# Patient Record
Sex: FEMALE | Race: WHITE | NOT HISPANIC OR LATINO
[De-identification: names, ages, dates, MRNs, and addresses within clinical notes are randomized per-mention and may not be internally consistent; named-entity substitution may affect disease eponyms.]

---

## 2023-03-16 ENCOUNTER — NON-APPOINTMENT (OUTPATIENT)
Age: 28
End: 2023-03-16

## 2023-03-16 PROBLEM — Z00.00 ENCOUNTER FOR PREVENTIVE HEALTH EXAMINATION: Status: ACTIVE | Noted: 2023-03-16

## 2023-04-12 ENCOUNTER — APPOINTMENT (OUTPATIENT)
Dept: OBGYN | Facility: CLINIC | Age: 28
End: 2023-04-12

## 2023-05-16 ENCOUNTER — APPOINTMENT (OUTPATIENT)
Dept: OBGYN | Facility: CLINIC | Age: 28
End: 2023-05-16
Payer: COMMERCIAL

## 2023-05-16 VITALS
HEIGHT: 64 IN | SYSTOLIC BLOOD PRESSURE: 116 MMHG | DIASTOLIC BLOOD PRESSURE: 74 MMHG | BODY MASS INDEX: 23.56 KG/M2 | WEIGHT: 138 LBS

## 2023-05-16 DIAGNOSIS — Z01.419 ENCOUNTER FOR GYNECOLOGICAL EXAMINATION (GENERAL) (ROUTINE) W/OUT ABNORMAL FINDINGS: ICD-10-CM

## 2023-05-16 PROCEDURE — 99385 PREV VISIT NEW AGE 18-39: CPT

## 2023-05-17 LAB
C TRACH RRNA SPEC QL NAA+PROBE: NOT DETECTED
HPV HIGH+LOW RISK DNA PNL CVX: NOT DETECTED
N GONORRHOEA RRNA SPEC QL NAA+PROBE: NOT DETECTED
SOURCE TP AMPLIFICATION: NORMAL

## 2023-05-21 LAB
CYTOLOGY CVX/VAG DOC THIN PREP: ABNORMAL
FMR1 GENE MUT ANL BLD/T: NORMAL

## 2023-05-22 LAB — CFTR MUT TESTED BLD/T: NEGATIVE

## 2023-05-23 LAB — AR GENE MUT ANL BLD/T: NORMAL

## 2023-05-24 ENCOUNTER — NON-APPOINTMENT (OUTPATIENT)
Age: 28
End: 2023-05-24

## 2023-11-30 DIAGNOSIS — N89.8 OTHER SPECIFIED NONINFLAMMATORY DISORDERS OF VAGINA: ICD-10-CM

## 2023-11-30 RX ORDER — FLUCONAZOLE 150 MG/1
150 TABLET ORAL
Qty: 1 | Refills: 0 | Status: ACTIVE | COMMUNITY
Start: 2023-11-30 | End: 1900-01-01

## 2024-01-29 DIAGNOSIS — Z32.00 ENCOUNTER FOR PREGNANCY TEST, RESULT UNKNOWN: ICD-10-CM

## 2024-01-30 LAB
ABO + RH PNL BLD: NORMAL
BLD GP AB SCN SERPL QL: NORMAL
HCG SERPL-MCNC: 554 MIU/ML
PROGEST SERPL-MCNC: 7.1 NG/ML

## 2024-02-01 LAB — HCG SERPL-MCNC: 1258 MIU/ML

## 2024-02-06 ENCOUNTER — EMERGENCY (EMERGENCY)
Facility: HOSPITAL | Age: 29
LOS: 1 days | Discharge: HOME | End: 2024-02-06
Attending: EMERGENCY MEDICINE | Admitting: EMERGENCY MEDICINE
Payer: COMMERCIAL

## 2024-02-06 VITALS
HEART RATE: 63 BPM | OXYGEN SATURATION: 98 % | DIASTOLIC BLOOD PRESSURE: 77 MMHG | SYSTOLIC BLOOD PRESSURE: 110 MMHG | TEMPERATURE: 98 F | RESPIRATION RATE: 18 BRPM

## 2024-02-06 LAB
ALBUMIN SERPL ELPH-MCNC: 4.6 G/DL — SIGNIFICANT CHANGE UP (ref 3.3–5)
ALP SERPL-CCNC: 68 U/L — SIGNIFICANT CHANGE UP (ref 40–120)
ALT FLD-CCNC: 15 U/L — SIGNIFICANT CHANGE UP (ref 4–33)
ANION GAP SERPL CALC-SCNC: 11 MMOL/L — SIGNIFICANT CHANGE UP (ref 7–14)
APPEARANCE UR: CLEAR — SIGNIFICANT CHANGE UP
AST SERPL-CCNC: 17 U/L — SIGNIFICANT CHANGE UP (ref 4–32)
BACTERIA # UR AUTO: NEGATIVE /HPF — SIGNIFICANT CHANGE UP
BASOPHILS # BLD AUTO: 0.01 K/UL — SIGNIFICANT CHANGE UP (ref 0–0.2)
BASOPHILS NFR BLD AUTO: 0.1 % — SIGNIFICANT CHANGE UP (ref 0–2)
BILIRUB SERPL-MCNC: 0.8 MG/DL — SIGNIFICANT CHANGE UP (ref 0.2–1.2)
BILIRUB UR-MCNC: NEGATIVE — SIGNIFICANT CHANGE UP
BLD GP AB SCN SERPL QL: NEGATIVE — SIGNIFICANT CHANGE UP
BUN SERPL-MCNC: 7 MG/DL — SIGNIFICANT CHANGE UP (ref 7–23)
CALCIUM SERPL-MCNC: 9.4 MG/DL — SIGNIFICANT CHANGE UP (ref 8.4–10.5)
CAST: 0 /LPF — SIGNIFICANT CHANGE UP (ref 0–4)
CHLORIDE SERPL-SCNC: 107 MMOL/L — SIGNIFICANT CHANGE UP (ref 98–107)
CO2 SERPL-SCNC: 23 MMOL/L — SIGNIFICANT CHANGE UP (ref 22–31)
COLOR SPEC: YELLOW — SIGNIFICANT CHANGE UP
CREAT SERPL-MCNC: 0.67 MG/DL — SIGNIFICANT CHANGE UP (ref 0.5–1.3)
DIFF PNL FLD: ABNORMAL
EGFR: 121 ML/MIN/1.73M2 — SIGNIFICANT CHANGE UP
EOSINOPHIL # BLD AUTO: 0.09 K/UL — SIGNIFICANT CHANGE UP (ref 0–0.5)
EOSINOPHIL NFR BLD AUTO: 1.1 % — SIGNIFICANT CHANGE UP (ref 0–6)
GLUCOSE SERPL-MCNC: 93 MG/DL — SIGNIFICANT CHANGE UP (ref 70–99)
GLUCOSE UR QL: NEGATIVE MG/DL — SIGNIFICANT CHANGE UP
HCG SERPL-ACNC: 137.4 MIU/ML — SIGNIFICANT CHANGE UP
HCG SERPL-MCNC: 238 MIU/ML
HCT VFR BLD CALC: 41.3 % — SIGNIFICANT CHANGE UP (ref 34.5–45)
HGB BLD-MCNC: 15.1 G/DL — SIGNIFICANT CHANGE UP (ref 11.5–15.5)
IANC: 5.18 K/UL — SIGNIFICANT CHANGE UP (ref 1.8–7.4)
IMM GRANULOCYTES NFR BLD AUTO: 0.5 % — SIGNIFICANT CHANGE UP (ref 0–0.9)
KETONES UR-MCNC: NEGATIVE MG/DL — SIGNIFICANT CHANGE UP
LEUKOCYTE ESTERASE UR-ACNC: NEGATIVE — SIGNIFICANT CHANGE UP
LYMPHOCYTES # BLD AUTO: 2.13 K/UL — SIGNIFICANT CHANGE UP (ref 1–3.3)
LYMPHOCYTES # BLD AUTO: 26.2 % — SIGNIFICANT CHANGE UP (ref 13–44)
MCHC RBC-ENTMCNC: 33.3 PG — SIGNIFICANT CHANGE UP (ref 27–34)
MCHC RBC-ENTMCNC: 36.6 GM/DL — HIGH (ref 32–36)
MCV RBC AUTO: 91.2 FL — SIGNIFICANT CHANGE UP (ref 80–100)
MONOCYTES # BLD AUTO: 0.68 K/UL — SIGNIFICANT CHANGE UP (ref 0–0.9)
MONOCYTES NFR BLD AUTO: 8.4 % — SIGNIFICANT CHANGE UP (ref 2–14)
NEUTROPHILS # BLD AUTO: 5.18 K/UL — SIGNIFICANT CHANGE UP (ref 1.8–7.4)
NEUTROPHILS NFR BLD AUTO: 63.7 % — SIGNIFICANT CHANGE UP (ref 43–77)
NITRITE UR-MCNC: NEGATIVE — SIGNIFICANT CHANGE UP
NRBC # BLD: 0 /100 WBCS — SIGNIFICANT CHANGE UP (ref 0–0)
NRBC # FLD: 0 K/UL — SIGNIFICANT CHANGE UP (ref 0–0)
PH UR: 6 — SIGNIFICANT CHANGE UP (ref 5–8)
PLATELET # BLD AUTO: 208 K/UL — SIGNIFICANT CHANGE UP (ref 150–400)
POTASSIUM SERPL-MCNC: 4.2 MMOL/L — SIGNIFICANT CHANGE UP (ref 3.5–5.3)
POTASSIUM SERPL-SCNC: 4.2 MMOL/L — SIGNIFICANT CHANGE UP (ref 3.5–5.3)
PROT SERPL-MCNC: 7.7 G/DL — SIGNIFICANT CHANGE UP (ref 6–8.3)
PROT UR-MCNC: NEGATIVE MG/DL — SIGNIFICANT CHANGE UP
RBC # BLD: 4.53 M/UL — SIGNIFICANT CHANGE UP (ref 3.8–5.2)
RBC # FLD: 11.1 % — SIGNIFICANT CHANGE UP (ref 10.3–14.5)
RBC CASTS # UR COMP ASSIST: 2 /HPF — SIGNIFICANT CHANGE UP (ref 0–4)
REVIEW: SIGNIFICANT CHANGE UP
RH IG SCN BLD-IMP: POSITIVE — SIGNIFICANT CHANGE UP
SODIUM SERPL-SCNC: 141 MMOL/L — SIGNIFICANT CHANGE UP (ref 135–145)
SP GR SPEC: 1 — SIGNIFICANT CHANGE UP (ref 1–1.03)
SQUAMOUS # UR AUTO: 1 /HPF — SIGNIFICANT CHANGE UP (ref 0–5)
UROBILINOGEN FLD QL: 0.2 MG/DL — SIGNIFICANT CHANGE UP (ref 0.2–1)
WBC # BLD: 8.13 K/UL — SIGNIFICANT CHANGE UP (ref 3.8–10.5)
WBC # FLD AUTO: 8.13 K/UL — SIGNIFICANT CHANGE UP (ref 3.8–10.5)
WBC UR QL: 0 /HPF — SIGNIFICANT CHANGE UP (ref 0–5)

## 2024-02-06 PROCEDURE — 99285 EMERGENCY DEPT VISIT HI MDM: CPT

## 2024-02-06 PROCEDURE — 76817 TRANSVAGINAL US OBSTETRIC: CPT | Mod: 26

## 2024-02-06 NOTE — ED PROVIDER NOTE - OBJECTIVE STATEMENT
28-year-old female G1, P0 presents to the ED for vaginal bleeding since yesterday.  Patient states she noted minor spotting yesterday but today when she woke up she noticed her entire toilet filled with blood and some clots.  She is endorsing abdominal cramps.   She is followed by gynecologist Dr. Orlando who has been trending her beta hCG for the last week. She denies any nausea vomiting, fevers or chills.  She denies shortness of breath, chest pain, lightheadedness, dizziness.

## 2024-02-06 NOTE — ED ADULT NURSE NOTE - TEMPLATE
----- Message from Micah Gonzalez MD sent at 3/24/2017 11:26 AM CDT -----  Please send letter about candidal esophagitis, to take medications as prescribed by infectious disease.   OB/GYN

## 2024-02-06 NOTE — ED PROVIDER NOTE - PHYSICAL EXAMINATION
Physical Exam:  Gen:  Well appearing, awake, alert, non-toxic appearing  Head: NCAT  HEENT: Normal conjunctiva, trachea midline, moist mucous membranes  Lung: CTAB, no respiratory distress, no wheezes/rhonchi/rales B/L, speaking in full sentences  CV: RRR, no murmurs, rubs or gallops  Abd: Soft, non-tender, non-distended,   MSK: No visible deformities,   Neuro: No focal motor deficits  Skin: Warm, well perfused, no visible rashes, no leg swelling  Psych: appropriate affect and mood

## 2024-02-06 NOTE — ED ADULT NURSE NOTE - NSFALLUNIVINTERV_ED_ALL_ED
Bed/Stretcher in lowest position, wheels locked, appropriate side rails in place/Call bell, personal items and telephone in reach/Instruct patient to call for assistance before getting out of bed/chair/stretcher/Non-slip footwear applied when patient is off stretcher/[place] to call system/Physically safe environment - no spills, clutter or unnecessary equipment/Purposeful proactive rounding/Room/bathroom lighting operational, light cord in reach

## 2024-02-06 NOTE — ED PROVIDER NOTE - NSFOLLOWUPINSTRUCTIONS_ED_ALL_ED_FT
Advance activity as tolerated.  Continue all previously prescribed medications as directed unless otherwise instructed.  Follow up with your OB in 48-72 hours- bring copies of your results.  Return to the ER for worsening or persistent symptoms, and/or ANY NEW OR CONCERNING SYMPTOMS. If you have issues obtaining follow up, please call: [phone] to obtain a doctor or specialist who takes your insurance in your area.  You may call [phone] to make an appointment with the internal medicine clinic.

## 2024-02-06 NOTE — ED ADULT NURSE NOTE - OBJECTIVE STATEMENT
pt received to intake 5, aox4.  pt c/o vag bleed with clots since yesterday.  pt is 7 weeks pregnant.  denies pain, appears in no acute distress.  SL placed, labs sent

## 2024-02-06 NOTE — ED ADULT TRIAGE NOTE - CHIEF COMPLAINT QUOTE
approx. 7 weeks pregnant, c/o vaginal bleeding with clots and cramping for couple days, RR even and unlabored, denies medical hx

## 2024-02-06 NOTE — ED PROVIDER NOTE - PATIENT PORTAL LINK FT
You can access the FollowMyHealth Patient Portal offered by [facility] by registering at the following website: [email or web address]. By joining [employer]’s FollowMyHealth portal, you will also be able to view your health information using other applications (apps) compatible with our system.

## 2024-02-07 LAB
CULTURE RESULTS: ABNORMAL
SPECIMEN SOURCE: SIGNIFICANT CHANGE UP

## 2024-02-09 DIAGNOSIS — R39.89 OTHER SYMPTOMS AND SIGNS INVOLVING THE GENITOURINARY SYSTEM: ICD-10-CM

## 2024-02-14 LAB
APPEARANCE: CLEAR
BACTERIA: ABNORMAL /HPF
BILIRUBIN URINE: NEGATIVE
BLOOD URINE: NEGATIVE
CAST: 1 /LPF
COLOR: YELLOW
EPITHELIAL CELLS: 4 /HPF
GLUCOSE QUALITATIVE U: NEGATIVE MG/DL
HCG SERPL-MCNC: 11 MIU/ML
KETONES URINE: NEGATIVE MG/DL
LEUKOCYTE ESTERASE URINE: NEGATIVE
MICROSCOPIC-UA: NORMAL
NITRITE URINE: NEGATIVE
PH URINE: 5.5
PROTEIN URINE: NEGATIVE MG/DL
RED BLOOD CELLS URINE: 2 /HPF
SPECIFIC GRAVITY URINE: 1.01
UROBILINOGEN URINE: 0.2 MG/DL
WHITE BLOOD CELLS URINE: 2 /HPF

## 2024-02-15 LAB — BACTERIA UR CULT: NORMAL

## 2024-04-01 PROBLEM — Z78.9 OTHER SPECIFIED HEALTH STATUS: Chronic | Status: ACTIVE | Noted: 2024-02-06

## 2024-05-06 ENCOUNTER — LABORATORY RESULT (OUTPATIENT)
Age: 29
End: 2024-05-06

## 2024-05-06 ENCOUNTER — APPOINTMENT (OUTPATIENT)
Dept: OBGYN | Facility: CLINIC | Age: 29
End: 2024-05-06
Payer: COMMERCIAL

## 2024-05-06 VITALS
DIASTOLIC BLOOD PRESSURE: 64 MMHG | BODY MASS INDEX: 22.53 KG/M2 | WEIGHT: 132 LBS | SYSTOLIC BLOOD PRESSURE: 110 MMHG | HEIGHT: 64 IN

## 2024-05-06 DIAGNOSIS — N91.1 SECONDARY AMENORRHEA: ICD-10-CM

## 2024-05-06 PROCEDURE — 81025 URINE PREGNANCY TEST: CPT

## 2024-05-06 PROCEDURE — 99214 OFFICE O/P EST MOD 30 MIN: CPT | Mod: 25

## 2024-05-06 PROCEDURE — 36415 COLL VENOUS BLD VENIPUNCTURE: CPT

## 2024-05-06 PROCEDURE — 76830 TRANSVAGINAL US NON-OB: CPT

## 2024-05-07 DIAGNOSIS — Z32.01 ENCOUNTER FOR PREGNANCY TEST, RESULT POSITIVE: ICD-10-CM

## 2024-05-07 LAB
ABO + RH PNL BLD: NORMAL
HCG SERPL-MCNC: ABNORMAL MIU/ML
PROGEST SERPL-MCNC: 11.5 NG/ML

## 2024-05-09 LAB — HCG SERPL-MCNC: ABNORMAL MIU/ML

## 2024-05-10 DIAGNOSIS — Z71.9 COUNSELING, UNSPECIFIED: ICD-10-CM

## 2024-05-13 ENCOUNTER — TRANSCRIPTION ENCOUNTER (OUTPATIENT)
Age: 29
End: 2024-05-13

## 2024-05-13 ENCOUNTER — APPOINTMENT (OUTPATIENT)
Dept: OBGYN | Facility: CLINIC | Age: 29
End: 2024-05-13
Payer: COMMERCIAL

## 2024-05-13 LAB
BASOPHILS # BLD AUTO: 0.01 K/UL
BASOPHILS NFR BLD AUTO: 0.2 %
EOSINOPHIL # BLD AUTO: 0.29 K/UL
EOSINOPHIL NFR BLD AUTO: 4.7 %
HCT VFR BLD CALC: 35.8 %
HGB BLD-MCNC: 12.6 G/DL
IMM GRANULOCYTES NFR BLD AUTO: 0.2 %
LYMPHOCYTES # BLD AUTO: 1.81 K/UL
LYMPHOCYTES NFR BLD AUTO: 29.3 %
MAN DIFF?: NORMAL
MCHC RBC-ENTMCNC: 32.6 PG
MCHC RBC-ENTMCNC: 35.2 GM/DL
MCV RBC AUTO: 92.7 FL
MONOCYTES # BLD AUTO: 0.61 K/UL
MONOCYTES NFR BLD AUTO: 9.9 %
NEUTROPHILS # BLD AUTO: 3.45 K/UL
NEUTROPHILS NFR BLD AUTO: 55.7 %
PLATELET # BLD AUTO: 181 K/UL
RBC # BLD: 3.86 M/UL
RBC # FLD: 11.6 %
WBC # FLD AUTO: 6.18 K/UL

## 2024-05-13 PROCEDURE — 99214 OFFICE O/P EST MOD 30 MIN: CPT | Mod: 95

## 2024-05-13 RX ORDER — IBUPROFEN 600 MG/1
600 TABLET, FILM COATED ORAL 4 TIMES DAILY
Qty: 15 | Refills: 0 | Status: ACTIVE | COMMUNITY
Start: 2024-05-13 | End: 1900-01-01

## 2024-05-13 NOTE — HISTORY OF PRESENT ILLNESS
[FreeTextEntry1] : This visit was provided via telehealth using 2-way audio and visual technology. The patient, MATTY DHILLON and provider, Patrice Sanches, were both located in [facility] and both participated in the telehealth encounter.  Verbal consent 2024 by, patient, MATTY DHILLON. Pt location: Home, [place], [place] Provider location: office, [address] [place] [address]  29yo  @ by LMP 3/3/24 here to discuss options for early pregnancy loss.  I have independently reviewed and interpreted ultrasound performed on 24, CRL 6.4 WGA.  No fetal heart tones identified.  Positive yolk sac.  No adnexal masses were seen.  No free fluid. Consistent with early pregnancy loss.  All: NKDA Meds: denies PMH: denies Surg: denies Gyn: remote h/o abnormal pap Ob: early SAB x1 (2024) Soc: denies x3, denies vape use  Reviewed common side effects of medication  as well as DA. Medical   Patient denies medical history of: Liver disease, Renal failure, Chronic adrenal failure, Long term systemic corticosteroid use, IUD in place, Anticoagulant use of hemorrhagic disorder, Inherited porphyrias, Anemia, Allergy to mifepristone, misoprostol or other prostaglandins  They understand that 10% of people who take medication  will need more medication or a surgical procedure to empty the uterus. They understand that medication  is not reversible. Common side effects and additional risks below reviewed.  Common side effects: cramping, bleeding, low grade fever, diarrhea, nausea, vomiting, headache, dizziness, back pain, feeling tired  The risks of medication  including: -	Continuing pregnancy, pregnancy tissue or blood clots in the uterus and the need for further medication or surgery -	Incomplete  causing heavy bleeding, infection, or both (which may require other testing or treatments such as further medication or surgery) -	Bleeding too much or too long which may require further treatment with medication or surgery, or a blood transfusion -	Infection in the uterus, which may require further treatment with medication, surgery or antibiotics.  It may also require hospital admission -	Allergic reaction to any or all of the medications used    Dilation and Aspiration Counseling   Options for the pregnancy were discussed with the patient, including continuation of pregnancy, and dilation and curettage (D&C) in the office under local anesthesia or in the operating room under sedation.   Risks of D&A includin. Infection: Patient was counseled on risk of infection and the use of prophylactic antibiotics, signs/symptoms of pre- and post-operative infection were reviewed. 2. Hemorrhage: Patient was counseled on the risk of hemorrhage, possibly requiring blood (and/or blood products) transfusion, management including use of but not limited to uterotonic medications. PT HAS NO OBJECTIONS TO BLOOD TRANSFUSION OR RECEIVING BLOOD PRODUCTS. 3. Injury/Perforation:  Risk of injury to vagina, cervix, uterus reviewed. Patient was counseled on the risk of uterine perforation with/without need for laparoscopy/laparotomy with/without injury to adjacent organs such as bowel/bladder. Reviewed risk of hysterectomy. 4.            Risk of retained products of conception  with/without need for medication or suction procedure to empty the uterus.     Pt is opting for DA under anesthesia. They understand these risks and agree to above.  I strongly recommended genetic testing given this is second miscarriage in a row.  The patient also understands it is their responsibility to bring to the attention of their physician any unusual symptoms following the procedure and to report to follow-up examinations.   They are sure of their decision and deny any coercion from family, friends or healthcare providers. The patient had the opportunity to ask questions and all questions were answered.

## 2024-05-13 NOTE — PLAN
[FreeTextEntry1] : 27 yo  with missed  9 weeks by dates, measuring 6+ weeks.    1. Dilation and Aspiration "Suction D&C" - All available medical records reviewed - All consents signed today, all questions/concerns addressed - Patient offered pamphlet for support services - Genetics- requesting, will send chromosomes and hold cells   2. Surgery scheduling - Patient to be precertified for D+A - D+A scheduled for 24 [facility] - PSTs- not indicated   3. ID/Neuro/cervical prep - doxycycline 200 mg in OR - Reviewed Ibuprofen 600 mg po q 6 prn   4. Labs/Blood type  - CBC at [facility] prior to procedure- done  WNL; Hgb 12.6 - TS on arrival -RH pos   5. Contraception/Conception -Patient desires pregnancy, will continue pnv   6. Post-op - Post-operative follow-up phone call virtual visit to be scheduled in 2 weeks - pre- and Post-operative instruction sheet given, reviewed bleeding and infection precautions - Provided 24 hour contact phone number - All questions/concerns of patient addressed to their satisfaction

## 2024-05-14 ENCOUNTER — APPOINTMENT (OUTPATIENT)
Dept: OBGYN | Facility: HOSPITAL | Age: 29
End: 2024-05-14

## 2024-05-14 ENCOUNTER — TRANSCRIPTION ENCOUNTER (OUTPATIENT)
Age: 29
End: 2024-05-14

## 2024-05-14 ENCOUNTER — RESULT REVIEW (OUTPATIENT)
Age: 29
End: 2024-05-14

## 2024-05-14 ENCOUNTER — OUTPATIENT (OUTPATIENT)
Dept: INPATIENT UNIT | Facility: HOSPITAL | Age: 29
LOS: 1 days | End: 2024-05-14
Payer: COMMERCIAL

## 2024-05-14 ENCOUNTER — APPOINTMENT (OUTPATIENT)
Dept: OBGYN | Facility: CLINIC | Age: 29
End: 2024-05-14

## 2024-05-14 VITALS
HEART RATE: 65 BPM | DIASTOLIC BLOOD PRESSURE: 69 MMHG | OXYGEN SATURATION: 100 % | RESPIRATION RATE: 15 BRPM | SYSTOLIC BLOOD PRESSURE: 99 MMHG

## 2024-05-14 VITALS
HEART RATE: 78 BPM | WEIGHT: 132.94 LBS | HEIGHT: 66 IN | RESPIRATION RATE: 16 BRPM | TEMPERATURE: 98 F | SYSTOLIC BLOOD PRESSURE: 103 MMHG | OXYGEN SATURATION: 100 % | DIASTOLIC BLOOD PRESSURE: 58 MMHG

## 2024-05-14 DIAGNOSIS — Z34.91 ENCOUNTER FOR SUPERVISION OF NORMAL PREGNANCY, UNSPECIFIED, FIRST TRIMESTER: ICD-10-CM

## 2024-05-14 DIAGNOSIS — O02.1 MISSED ABORTION: ICD-10-CM

## 2024-05-14 LAB — BLD GP AB SCN SERPL QL: SIGNIFICANT CHANGE UP

## 2024-05-14 PROCEDURE — 76998 US GUIDE INTRAOP: CPT | Mod: 26,GC

## 2024-05-14 PROCEDURE — 36415 COLL VENOUS BLD VENIPUNCTURE: CPT

## 2024-05-14 PROCEDURE — 86850 RBC ANTIBODY SCREEN: CPT

## 2024-05-14 PROCEDURE — 88305 TISSUE EXAM BY PATHOLOGIST: CPT

## 2024-05-14 PROCEDURE — 59820 CARE OF MISCARRIAGE: CPT

## 2024-05-14 PROCEDURE — 88305 TISSUE EXAM BY PATHOLOGIST: CPT | Mod: 26

## 2024-05-14 PROCEDURE — 88264 CHROMOSOME ANALYSIS 20-25: CPT

## 2024-05-14 PROCEDURE — 86900 BLOOD TYPING SEROLOGIC ABO: CPT

## 2024-05-14 PROCEDURE — 86901 BLOOD TYPING SEROLOGIC RH(D): CPT

## 2024-05-14 PROCEDURE — 88280 CHROMOSOME KARYOTYPE STUDY: CPT

## 2024-05-14 PROCEDURE — 88233 TISSUE CULTURE SKIN/BIOPSY: CPT

## 2024-05-14 RX ORDER — ACETAMINOPHEN 500 MG
975 TABLET ORAL ONCE
Refills: 0 | Status: COMPLETED | OUTPATIENT
Start: 2024-05-14 | End: 2024-05-14

## 2024-05-14 RX ORDER — KETOROLAC TROMETHAMINE 30 MG/ML
30 SYRINGE (ML) INJECTION ONCE
Refills: 0 | Status: DISCONTINUED | OUTPATIENT
Start: 2024-05-14 | End: 2024-05-14

## 2024-05-14 RX ORDER — ACETAMINOPHEN 500 MG
1000 TABLET ORAL ONCE
Refills: 0 | Status: DISCONTINUED | OUTPATIENT
Start: 2024-05-14 | End: 2024-05-14

## 2024-05-14 RX ORDER — FENTANYL CITRATE 50 UG/ML
25 INJECTION INTRAVENOUS ONCE
Refills: 0 | Status: DISCONTINUED | OUTPATIENT
Start: 2024-05-14 | End: 2024-05-14

## 2024-05-14 RX ORDER — HYDROMORPHONE HYDROCHLORIDE 2 MG/ML
0.5 INJECTION INTRAMUSCULAR; INTRAVENOUS; SUBCUTANEOUS ONCE
Refills: 0 | Status: DISCONTINUED | OUTPATIENT
Start: 2024-05-14 | End: 2024-05-14

## 2024-05-14 RX ORDER — ONDANSETRON 8 MG/1
4 TABLET, FILM COATED ORAL ONCE
Refills: 0 | Status: COMPLETED | OUTPATIENT
Start: 2024-05-14 | End: 2024-05-14

## 2024-05-14 RX ADMIN — Medication 250 MILLIGRAM(S): at 10:55

## 2024-05-14 RX ADMIN — ONDANSETRON 4 MILLIGRAM(S): 8 TABLET, FILM COATED ORAL at 11:25

## 2024-05-14 RX ADMIN — Medication 975 MILLIGRAM(S): at 08:57

## 2024-05-14 NOTE — ASU DISCHARGE PLAN (ADULT/PEDIATRIC) - CARE PROVIDER_API CALL
Verónica Ibrahim  Obstetrics and Gynecology  [address] [place] [address], [address]  [place] [address]  Phone: [phone]  Fax: [phone]  Follow Up Time: 2 weeks

## 2024-05-14 NOTE — BRIEF OPERATIVE NOTE - OPERATION/FINDINGS
8 week sized uterus. Gestational sac visualized on ultrasound prior to procedure. Thin endometrial lining noted at conclusion of procedure.

## 2024-05-14 NOTE — ASU DISCHARGE PLAN (ADULT/PEDIATRIC) - MEDICATION INSTRUCTIONS
May take ibuprofen 600mg every 6 hours as needed for pain as prescribed. May also take tylenol 975mg every 6 hours as needed.

## 2024-05-14 NOTE — BRIEF OPERATIVE NOTE - NSICDXBRIEFPROCEDURE_GEN_ALL_CORE_FT
PROCEDURES:  Dilation and curettage of uterus using suction with ultrasound guidance 14-May-2024 11:32:36  Anjana Salinas

## 2024-05-14 NOTE — ASU DISCHARGE PLAN (ADULT/PEDIATRIC) - PAIN MANAGEMENT
[facility]  Administration of Anesthesia  I, Eliana Jean agree to be cared for by a physician anesthesiologist alone and/or with a nurse anesthetist, who is specially trained to monitor me and give me medicine to put me to sleep or keep me comfortable during my procedure    I understand that my anesthesiologist and/or anesthetist is not an employee or agent of [facility] or [employer]. He or she works for [facility]    As the patient asking for anesthesia services, I agree to:  Allow the anesthesiologist (anesthesia doctor) to give me medicine and do additional procedures as necessary. Some examples are: Starting or using an “IV” to give me medicine, fluids or blood during my procedure, and having a breathing tube placed to help me breathe when I’m asleep (intubation). In the event that my heart stops working properly, I understand that my anesthesiologist will make every effort to sustain my life, unless otherwise directed by [facility] Do Not Resuscitate documents.  Tell my anesthesia doctor before my procedure:  If I am pregnant.  The last time that I ate or drank.  iii. All of the medicines I take (including prescriptions, herbal supplements, and pills I can buy without a prescription (including street drugs/illegal medications). Failure to inform my anesthesiologist about these medicines may increase my risk of anesthetic complications.  iv.If I am allergic to anything or have had a reaction to anesthesia before.  I understand how the anesthesia medicine will help me (benefits).  I understand that with any type of anesthesia medicine there are risks:  The most common risks are: nausea, vomiting, sore throat, muscle soreness, damage to my eyes, mouth, or teeth (from breathing tube placement).  Rare risks include: remembering what happened during my procedure, allergic reactions to medications, injury to my airway, heart, lungs, vision, nerves, or  muscles and in extremely rare instances death.  My doctor has explained to me other choices available to me for my care (alternatives).  Pregnant Patients (“epidural”):  I understand that the risks of having an epidural (medicine given into my back to help control pain during labor), include itching, low blood pressure, difficulty urinating, headache or slowing of the baby’s heart. Very rare risks include infection, bleeding, seizure, irregular heart rhythms and nerve injury.  Regional Anesthesia (“spinal”, “epidural”, & “nerve blocks”):  I understand that rare but potential complications include headache, bleeding, infection, seizure, irregular heart rhythms, and nerve injury.    _____________________________________________________________________________  Patient (or Representative) Signature/Relationship to Patient  Date   Time    _____________________________________________________________________________   Name (if used)    Language/Organization   Time    _____________________________________________________________________________  Nurse Anesthetist Signature     Date   Time  _____________________________________________________________________________  Anesthesiologist Signature     Date   Time  I have discussed the procedure and information above with the patient (or patient’s representative) and answered their questions. The patient or their representative has agreed to have anesthesia services.    _____________________________________________________________________________  Witness        Date   Time  I have verified that the signature is that of the patient or patient’s representative, and that it was signed before the procedure  Patient Name: Eliana Jean     : 1977                 Printed: 2025 at 1:50 PM    Medical Record #: [id number]                                            Page 1 of 1  ----------ANESTHESIA CONSENT----------     Take over the counter pain medication/Prescriptions electronically submitted to pharmacy from doctor's office

## 2024-05-15 LAB — SURGICAL PATHOLOGY STUDY: SIGNIFICANT CHANGE UP

## 2024-05-17 ENCOUNTER — APPOINTMENT (OUTPATIENT)
Dept: OBGYN | Facility: CLINIC | Age: 29
End: 2024-05-17

## 2024-05-31 ENCOUNTER — APPOINTMENT (OUTPATIENT)
Dept: OBGYN | Facility: CLINIC | Age: 29
End: 2024-05-31
Payer: COMMERCIAL

## 2024-05-31 DIAGNOSIS — O02.1 MISSED ABORTION: ICD-10-CM

## 2024-05-31 PROCEDURE — 99024 POSTOP FOLLOW-UP VISIT: CPT

## 2024-05-31 NOTE — HISTORY OF PRESENT ILLNESS
[FreeTextEntry1] : This visit was provided via telehealth using 2-way audio and visual technology. The patient, MATTY DHILLON and provider, Patrice Sanches, were both located in [facility] and both participated in the telehealth encounter. Verbal consent 2024  by, patient, MATTY DHILLON.   Pt location: Home, [place], [place] Provider location: office, [address] [place] [address]  28 yo  s/p OR aspiration for early pregnancy loss (second consecutive) presenting for follow up. PT feeling well, no bleeding, no pain. Discussed genetic testing is pending and difference between karyotype and microarray in lay person's terms.   recently went for semenanalysis.
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
## 2024-06-06 LAB — CHROM ANALY OVERALL INTERP SPEC-IMP: SIGNIFICANT CHANGE UP

## 2024-06-14 DIAGNOSIS — O03.9 COMPLETE OR UNSPECIFIED SPONTANEOUS ABORTION W/OUT COMPLICATION: ICD-10-CM

## 2024-06-24 ENCOUNTER — ASOB RESULT (OUTPATIENT)
Age: 29
End: 2024-06-24

## 2024-06-24 ENCOUNTER — APPOINTMENT (OUTPATIENT)
Dept: MATERNAL FETAL MEDICINE | Facility: CLINIC | Age: 29
End: 2024-06-24
Payer: COMMERCIAL

## 2024-06-24 PROCEDURE — 99202 OFFICE O/P NEW SF 15 MIN: CPT | Mod: 95

## 2024-08-12 ENCOUNTER — LABORATORY RESULT (OUTPATIENT)
Age: 29
End: 2024-08-12

## 2024-08-26 DIAGNOSIS — Z87.59 PERSONAL HISTORY OF OTHER COMPLICATIONS OF PREGNANCY, CHILDBIRTH AND THE PUERPERIUM: ICD-10-CM

## 2024-08-27 ENCOUNTER — NON-APPOINTMENT (OUTPATIENT)
Age: 29
End: 2024-08-27

## 2024-09-11 ENCOUNTER — APPOINTMENT (OUTPATIENT)
Dept: ANTEPARTUM | Facility: CLINIC | Age: 29
End: 2024-09-11

## 2024-09-11 ENCOUNTER — APPOINTMENT (OUTPATIENT)
Dept: MATERNAL FETAL MEDICINE | Facility: CLINIC | Age: 29
End: 2024-09-11
Payer: COMMERCIAL

## 2024-09-11 VITALS
HEART RATE: 56 BPM | OXYGEN SATURATION: 99 % | WEIGHT: 136 LBS | BODY MASS INDEX: 21.86 KG/M2 | RESPIRATION RATE: 16 BRPM | DIASTOLIC BLOOD PRESSURE: 60 MMHG | HEIGHT: 66 IN | SYSTOLIC BLOOD PRESSURE: 100 MMHG

## 2024-09-11 DIAGNOSIS — Z32.01 ENCOUNTER FOR PREGNANCY TEST, RESULT POSITIVE: ICD-10-CM

## 2024-09-11 DIAGNOSIS — N91.1 SECONDARY AMENORRHEA: ICD-10-CM

## 2024-09-11 DIAGNOSIS — Z01.419 ENCOUNTER FOR GYNECOLOGICAL EXAMINATION (GENERAL) (ROUTINE) W/OUT ABNORMAL FINDINGS: ICD-10-CM

## 2024-09-11 DIAGNOSIS — Z87.42 PERSONAL HISTORY OF OTHER DISEASES OF THE FEMALE GENITAL TRACT: ICD-10-CM

## 2024-09-11 DIAGNOSIS — Z87.59 PERSONAL HISTORY OF OTHER COMPLICATIONS OF PREGNANCY, CHILDBIRTH AND THE PUERPERIUM: ICD-10-CM

## 2024-09-11 DIAGNOSIS — N96 RECURRENT PREGNANCY LOSS: ICD-10-CM

## 2024-09-11 DIAGNOSIS — Z71.9 COUNSELING, UNSPECIFIED: ICD-10-CM

## 2024-09-11 DIAGNOSIS — R39.89 OTHER SYMPTOMS AND SIGNS INVOLVING THE GENITOURINARY SYSTEM: ICD-10-CM

## 2024-09-11 DIAGNOSIS — Z32.00 ENCOUNTER FOR PREGNANCY TEST, RESULT UNKNOWN: ICD-10-CM

## 2024-09-11 DIAGNOSIS — Z31.69 ENCOUNTER FOR OTHER GENERAL COUNSELING AND ADVICE ON PROCREATION: ICD-10-CM

## 2024-09-11 PROCEDURE — 99214 OFFICE O/P EST MOD 30 MIN: CPT

## 2024-09-11 NOTE — HISTORY OF PRESENT ILLNESS
[FreeTextEntry1] : Valerie presents to discuss her pregnancy hisotry and the recent evaluations she has received.
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
## 2024-09-11 NOTE — DISCUSSION/SUMMARY
[FreeTextEntry1] : Reassurances given, with no interventions needed at this time.   Upon a positive urine HCG, an early sonogram (6 weeks) and serum HCG and Progesterone is recommended.

## 2024-09-20 ENCOUNTER — APPOINTMENT (OUTPATIENT)
Dept: OBGYN | Facility: CLINIC | Age: 29
End: 2024-09-20

## 2024-09-30 DIAGNOSIS — N96 RECURRENT PREGNANCY LOSS: ICD-10-CM

## 2024-10-01 LAB
HCG SERPL-MCNC: 947 MIU/ML
PROGEST SERPL-MCNC: 18.5 NG/ML

## 2024-10-02 DIAGNOSIS — Z32.00 ENCOUNTER FOR PREGNANCY TEST, RESULT UNKNOWN: ICD-10-CM

## 2024-10-07 LAB
HCG SERPL-MCNC: 1891 MIU/ML
HCG SERPL-MCNC: 3843 MIU/ML
PROGEST SERPL-MCNC: 18.3 NG/ML
PROGEST SERPL-MCNC: 18.7 NG/ML

## 2024-10-10 ENCOUNTER — APPOINTMENT (OUTPATIENT)
Dept: OBGYN | Facility: CLINIC | Age: 29
End: 2024-10-10
Payer: COMMERCIAL

## 2024-10-10 PROCEDURE — 36415 COLL VENOUS BLD VENIPUNCTURE: CPT

## 2024-10-10 PROCEDURE — 76817 TRANSVAGINAL US OBSTETRIC: CPT

## 2024-10-10 PROCEDURE — 81002 URINALYSIS NONAUTO W/O SCOPE: CPT

## 2024-10-10 PROCEDURE — 81025 URINE PREGNANCY TEST: CPT

## 2024-10-10 PROCEDURE — 99214 OFFICE O/P EST MOD 30 MIN: CPT
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
## 2024-10-29 ENCOUNTER — APPOINTMENT (OUTPATIENT)
Dept: OBGYN | Facility: CLINIC | Age: 29
End: 2024-10-29
Payer: COMMERCIAL

## 2024-10-29 ENCOUNTER — APPOINTMENT (OUTPATIENT)
Dept: OBGYN | Facility: CLINIC | Age: 29
End: 2024-10-29

## 2024-10-29 PROCEDURE — 0502F SUBSEQUENT PRENATAL CARE: CPT

## 2024-10-29 PROCEDURE — 81002 URINALYSIS NONAUTO W/O SCOPE: CPT

## 2024-10-29 PROCEDURE — 76817 TRANSVAGINAL US OBSTETRIC: CPT

## 2024-11-18 ENCOUNTER — APPOINTMENT (OUTPATIENT)
Dept: OBGYN | Facility: CLINIC | Age: 29
End: 2024-11-18
Payer: COMMERCIAL

## 2024-11-18 PROCEDURE — 0502F SUBSEQUENT PRENATAL CARE: CPT

## 2024-11-18 PROCEDURE — 76801 OB US < 14 WKS SINGLE FETUS: CPT

## 2024-11-18 PROCEDURE — 81002 URINALYSIS NONAUTO W/O SCOPE: CPT

## 2024-11-27 ENCOUNTER — ASOB RESULT (OUTPATIENT)
Age: 29
End: 2024-11-27

## 2024-11-27 ENCOUNTER — APPOINTMENT (OUTPATIENT)
Dept: ANTEPARTUM | Facility: CLINIC | Age: 29
End: 2024-11-27
Payer: COMMERCIAL

## 2024-11-27 PROCEDURE — 76813 OB US NUCHAL MEAS 1 GEST: CPT

## 2024-11-27 PROCEDURE — 76801 OB US < 14 WKS SINGLE FETUS: CPT

## 2024-12-23 ENCOUNTER — APPOINTMENT (OUTPATIENT)
Dept: OBGYN | Facility: CLINIC | Age: 29
End: 2024-12-23
Payer: COMMERCIAL

## 2024-12-23 PROCEDURE — 81002 URINALYSIS NONAUTO W/O SCOPE: CPT

## 2024-12-23 PROCEDURE — 36415 COLL VENOUS BLD VENIPUNCTURE: CPT

## 2024-12-23 PROCEDURE — 76815 OB US LIMITED FETUS(S): CPT

## 2024-12-23 PROCEDURE — 0502F SUBSEQUENT PRENATAL CARE: CPT

## 2025-01-22 ENCOUNTER — APPOINTMENT (OUTPATIENT)
Dept: ANTEPARTUM | Facility: CLINIC | Age: 30
End: 2025-01-22
Payer: COMMERCIAL

## 2025-01-22 ENCOUNTER — ASOB RESULT (OUTPATIENT)
Age: 30
End: 2025-01-22

## 2025-01-22 PROCEDURE — 0502F SUBSEQUENT PRENATAL CARE: CPT

## 2025-01-22 PROCEDURE — 81002 URINALYSIS NONAUTO W/O SCOPE: CPT

## 2025-01-30 ENCOUNTER — NON-APPOINTMENT (OUTPATIENT)
Age: 30
End: 2025-01-30

## 2025-02-03 ENCOUNTER — EMERGENCY (EMERGENCY)
Facility: HOSPITAL | Age: 30
LOS: 1 days | Discharge: HOME | End: 2025-02-03
Admitting: STUDENT IN AN ORGANIZED HEALTH CARE EDUCATION/TRAINING PROGRAM
Payer: COMMERCIAL

## 2025-02-03 VITALS
HEART RATE: 92 BPM | WEIGHT: 141.1 LBS | TEMPERATURE: 97 F | RESPIRATION RATE: 20 BRPM | OXYGEN SATURATION: 100 % | DIASTOLIC BLOOD PRESSURE: 77 MMHG | SYSTOLIC BLOOD PRESSURE: 126 MMHG

## 2025-02-03 LAB
ADD ON TEST-SPECIMEN IN LAB: SIGNIFICANT CHANGE UP
FLUAV AG NPH QL: DETECTED
FLUBV AG NPH QL: SIGNIFICANT CHANGE UP
RSV RNA NPH QL NAA+NON-PROBE: SIGNIFICANT CHANGE UP
SARS-COV-2 RNA SPEC QL NAA+PROBE: SIGNIFICANT CHANGE UP

## 2025-02-03 PROCEDURE — 93010 ELECTROCARDIOGRAM REPORT: CPT

## 2025-02-03 PROCEDURE — 99284 EMERGENCY DEPT VISIT MOD MDM: CPT

## 2025-02-03 RX ORDER — ACETAMINOPHEN 160 MG/5ML
650 SUSPENSION ORAL ONCE
Refills: 0 | Status: COMPLETED | OUTPATIENT
Start: 2025-02-03 | End: 2025-02-03

## 2025-02-03 RX ORDER — PHENOL 1.4 %
1 AEROSOL, SPRAY (ML) MUCOUS MEMBRANE ONCE
Refills: 0 | Status: COMPLETED | OUTPATIENT
Start: 2025-02-03 | End: 2025-02-03

## 2025-02-03 RX ADMIN — Medication 1 LOZENGE: at 09:50

## 2025-02-03 NOTE — ED PROVIDER NOTE - NSFOLLOWUPINSTRUCTIONS_ED_ALL_ED_FT
REST, NO STRENUOUS ACTIVITY  DRINK PLENTY OF FLUIDS, TYLENOL AS NEEDED FOR FEVERS/ACHES (SAFE IN PREGNANCY)  YOU CAN TAKE OVER THE COUNTER COUGH MEDICINES AS WELL LIKE:  GUAFENESIN AND DEXTROMETHORPHAN AS NEEDED  PLEASE FOLLOW UP WITH YOUR OBGYN AS SCHEDULED  RETURN TO ER FOR WORSENING SYMPTOMS         Upper Respiratory Infection, Adult    An upper respiratory infection (URI) affects the nose, throat, and upper air passages. URIs are caused by germs (viruses). The most common type of URI is often called "the common cold."    Medicines cannot cure URIs, but you can do things at home to relieve your symptoms. URIs usually get better within 7–10 days.    Follow these instructions at home:  Activity    Rest as needed.  If you have a fever, stay home from work or school until your fever is gone, or until your doctor says you may return to work or school.  You should stay home until you cannot spread the infection anymore (you are not contagious).  Your doctor may have you wear a face mask so you have less risk of spreading the infection.    Relieving symptoms    Gargle with a salt-water mixture 3–4 times a day or as needed. To make a salt-water mixture, completely dissolve ½–1 tsp of salt in 1 cup of warm water.  Use a cool-mist humidifier to add moisture to the air. This can help you breathe more easily.    Eating and drinking    Drink enough fluid to keep your pee (urine) pale yellow.  Eat soups and other clear broths.     General instructions    Take over-the-counter and prescription medicines only as told by your doctor. These include cold medicines, fever reducers, and cough suppressants.  Do not use any products that contain nicotine or tobacco. These include cigarettes and e-cigarettes. If you need help quitting, ask your doctor.  Avoid being where people are smoking (avoid secondhand smoke).  Make sure you get regular shots and get the flu shot every year.  Keep all follow-up visits as told by your doctor. This is important.     How to avoid spreading infection to others    Wash your hands often with soap and water. If you do not have soap and water, use hand .  Avoid touching your mouth, face, eyes, or nose.  Cough or sneeze into a tissue or your sleeve or elbow. Do not cough or sneeze into your hand or into the air.     Contact a doctor if:  You are getting worse, not better.  You have any of these:  A fever.  Chills.  Brown or red mucus in your nose.  Yellow or brown fluid (discharge)coming from your nose.  Pain in your face, especially when you bend forward.  Swollen neck glands.  Pain with swallowing.  White areas in the back of your throat.    Get help right away if:  You have shortness of breath that gets worse.  You have very bad or constant:  Headache.  Ear pain.  Pain in your forehead, behind your eyes, and over your cheekbones (sinus pain).  Chest pain.  You have long-lasting (chronic) lung disease along with any of these:  Wheezing.  Long-lasting cough.  Coughing up blood.  A change in your usual mucus.  You have a stiff neck.  You have changes in your:  Vision.  Hearing.  Thinking.  Mood.    Summary  An upper respiratory infection (URI) is caused by a germ called a virus. The most common type of URI is often called "the common cold."  URIs usually get better within 7–10 days.  Take over-the-counter and prescription medicines only as told by your doctor.    ADDITIONAL NOTES AND INSTRUCTIONS    Please follow up with your Primary MD in 24-48 hr.  Seek immediate medical care for any new/worsening signs or symptoms.     Infección de las vías respiratorias superiores, en adultos    Yvette infección de las vías respiratorias superiores (URI) afecta la nariz, la garganta y las vías respiratorias superiores. Los URI son causados ??por gérmenes (virus). El tipo más común de URI a menudo se denomina "resfriado común".    Los medicamentos no pueden curar las infecciones respiratorias superiores, saleem puede hacer cosas en casa para aliviar kemi síntomas. Las URI generalmente mejoran en 7 a 10 días.    Siga estas instrucciones en casa:  Actividad    Descanse según sea necesario.  Si tiene fiebre, quédese en casa y no vaya al trabajo ni a la escuela hasta que le desaparezca la fiebre o hasta que chirinos médico le diga que puede regresar al trabajo o la escuela.  Debe quedarse en casa hasta que ya no pueda propagar la infección (ya no es contagioso).  Es posible que chirinos médico le pida que use yvette mascarilla para que tenga menos riesgo de propagar la infección.    Aliviar los síntomas    Shey gárgaras con yvette mezcla de agua salada de 3 a 4 veces al día o según sea necesario. Para hacer yvette mezcla de agua salada, disuelva completamente entre ½ y 1 cucharadita de sal en 1 taza de agua tibia.  Use un humidificador de vapor frío para agregar humedad al aire. [place] puede ayudarlo a respirar más fácilmente.    Comiendo y bebiendo    Shanae suficiente líquido para mantener chirinos pipí (orina) de color amarillo pálido.  Come sopas y otros caldos yosef.    Instrucciones generales    [place] medicamentos de venta marcelo y recetados solo según las indicaciones de chirinos médico. Estos incluyen medicamentos para el resfriado, antifebriles y antitusígenos.  No use ningún producto que contenga nicotina o tabaco. Estos incluyen cigarrillos y cigarrillos electrónicos. Si necesita ayuda para dejar de fumar, consulte con chirinos médico.  Evite estar donde la gente fuma (evite el humo de segunda mano).  Asegúrese de recibir vacunas regulares y vacunarse contra la gripe todos los años.  Asista a todas las visitas de seguimiento que le indique chirinos médico. [place] es importante.    Cómo evitar transmitir la infección a otras personas    Lávese las gaurav frecuentemente con agua y jabón. Si no tiene agua y jabón, use desinfectante para gaurav.  Evite tocarse la boca, la arthur, los ojos o la nariz.  Tosa o estornude en un pañuelo de papel, en la manga o en el codo. No tosa ni estornude en chirinos mano o en el aire.    Comuníquese con un médico si:  Estás empeorando, no mejorando.  Tienes alguno de estos:  Fiebre  Escalofríos.  Moco marrón o de guzman en la nariz.  Fluido (secreción) amarillo o marrón que sale de la nariz.  Dolor en la arthur, especialmente cuando se inclina hacia adelante.  Glándulas del jigar hinchadas.  Dolor al tragar.  Áreas cheri en la parte posterior de la garganta.    Obtenga ayuda de inmediato si:  Tiene dificultad para respirar que empeora.  Tienes muy rob o amarilys:  Dolor de amol.  Dolor de oído.  Dolor en la frente, detrás de los ojos y sobre los pómulos (dolor de los senos nasales).  Dolor en el pecho.  Tiene yvette enfermedad pulmonar prolongada (crónica) junto con cualquiera de los siguientes:  Sibilancias.  Tos de larga duración.  Tosiendo greer.  Un cambio en chirinos moco habitual.  Tienes rigidez en el jigar.  Tiene cambios en chirinos:  Visión.  Escuchando.  Pensando.  Estado animico.    Resumen  Yvette infección de las vías respiratorias superiores (URI) es causada por un germen llamado virus. El tipo más común de URI a menudo se denomina "resfriado común".  Las URI generalmente mejoran en 7 a 10 días.  [place] medicamentos de venta marcelo y recetados solo según las indicaciones de chirinos médico.    NOTAS E INSTRUCCIONES ADICIONALES    Shey un seguimiento con chirinos médico de cabecera en 24-48 horas.  Busque atención médica inmediata ante cualquier signo o síntoma nuevo o que empeore.      Viral Syndrome    WHAT YOU NEED TO KNOW:    Viral syndrome is a term used for symptoms of an infection caused by a virus. Viruses are spread easily from person to person through the air and on shared items.    DISCHARGE INSTRUCTIONS:    Call your local emergency number (911 in the [place]) or have someone else call if:    You have a seizure.    You cannot be woken.    You have chest pain or trouble breathing.  Seek care immediately if:    You have a stiff neck, a bad headache, and sensitivity to light.    You feel weak, dizzy, or confused.    You stop urinating or urinate a lot less than usual.    You cough up blood or thick yellow or green mucus.    You have severe abdominal pain or your abdomen is larger than usual.  Call your doctor if:    Your symptoms do not get better with treatment or get worse after 3 days.    You have a rash or ear pain.    You have burning when you urinate.    You have questions or concerns about your condition or care.  Medicines: You may need any of the following:    Acetaminophen decreases pain and fever. It is available without a doctor's order. Ask how much to take and how often to take it. Follow directions. Read the labels of all other medicines you are using to see if they also contain acetaminophen, or ask your doctor or pharmacist. Acetaminophen can cause liver damage if not taken correctly. Do not use more than 4 grams (4,000 milligrams) total of acetaminophen in one day.    NSAIDs, such as ibuprofen, help decrease swelling, pain, and fever. NSAIDs can cause stomach bleeding or kidney problems in certain people. If you take blood thinner medicine, always ask your healthcare provider if NSAIDs are safe for you. Always read the medicine label and follow directions.    Cold medicine helps decrease swelling, control a cough, and relieve chest or nasal congestion.    Saline nasal spray helps decrease nasal congestion.    Take your medicine as directed. Contact your healthcare provider if you think your medicine is not helping or if you have side effects. Tell him of her if you are allergic to any medicine. Keep a list of the medicines, vitamins, and herbs you take. Include the amounts, and when and why you take them. Bring the list or the pill bottles to follow-up visits. Carry your medicine list with you in case of an emergency.  Manage your symptoms:    Drink liquids as directed to prevent dehydration. Ask how much liquid to drink each day and which liquids are best for you. Ask if you should drink an oral rehydration solution (ORS). An ORS has the right amounts of water, salts, and sugar you need to replace body fluids. This may help prevent dehydration caused by vomiting or diarrhea. Do not drink liquids with caffeine. Liquids with caffeine can make dehydration worse.    Get plenty of rest to help your body heal. Take naps throughout the day. Ask your healthcare provider when you can return to work and your normal activities.    Use a cool mist humidifier to help you breathe easier. Ask your healthcare provider how to use a cool mist humidifier.    Eat honey or use cough drops for a sore throat. Cough drops are available without a doctor's order. Follow directions for taking cough drops.    Do not smoke or be close to anyone who is smoking. Nicotine and other chemicals in cigarettes and cigars can cause lung damage. Smoking can also delay healing. Ask your healthcare provider for information if you currently smoke and need help to quit. E-cigarettes or smokeless tobacco still contain nicotine. Talk to your healthcare provider before you use these products.  Prevent the spread of germs:      Wash your hands often. Wash your hands several times each day. Wash after you use the bathroom, change a child's diaper, and before you prepare or eat food. Use soap and water every time. Rub your soapy hands together, lacing your fingers. Wash the front and back of your hands, and in between your fingers. Use the fingers of one hand to scrub under the fingernails of the other hand. Wash for at least 20 seconds. Rinse with warm, running water for several seconds. Then dry your hands with a clean towel or paper towel. Use hand  that contains alcohol if soap and water are not available. Do not touch your eyes, nose, or mouth without washing your hands first.  Handwashing      Cover a sneeze or cough. Use a tissue that covers your mouth and nose. Throw the tissue away in a trash can right away. Use the bend of your arm if a tissue is not available. Wash your hands well with soap and water or use a hand .    Stay away from others while you are sick. Avoid crowds as much as possible.    Ask about vaccines you may need. Talk to your healthcare provider about your vaccine history. He or she will tell you which vaccines you need, and when to get them.  Get the influenza (flu) vaccine as soon as recommended each year. The flu vaccine is available starting in September or October. Flu viruses change, so it is important to get a flu vaccine every year.    Get the pneumonia vaccine if recommended. This vaccine is usually recommended every 5 years. Your provider will tell you when to get this vaccine, if needed.  Follow up with your doctor as directed: Write down your questions so you remember to ask them during your visits.    Síndrome viral    LO QUE NECESITA SABER:    El síndrome viral es un término usado para los síntomas de yvette infección causada por un virus. Los virus son propagados fácilmente de yvette persona a otra a través del aire y mediante los objetos que se comparten.    [facility]CCIONES SOBRE [facility]:    Llame al número local de emergencias (911 en los [place]), o pídale a alguien que llame si:    Usted sufre yvette convulsión.    No es posible despertarlo.    Usted tiene dolor torácico y dificultad para respirar.  Busque atención médica de inmediato si:    Usted tiene rigidez en el jigar, dolor de amol intenso y sensibilidad a la sonu.    Usted se siente débil, mareado o confundido.    Usted farzana de orinar u orina menos de lo habitual.    Usted tose greer o yvette mucosidad espesa amarilla o cora.    Usted tiene dolor abdominal severo o chirinos abdomen está más grant de lo habitual.  Llame a chirinos médico si:    Kemi síntomas no mejoran con el tratamiento o empeoran después de 3 días.    Usted tiene salpullido o dolor de oído.    Usted siente ardor al orinar.    Usted tiene preguntas o inquietudes acerca de chirinos condición o cuidado.  Medicamentos:Es posible que usted necesite alguno de los siguientes:    Acetaminofénalivia el dolor y baja la fiebre. Está disponible sin receta médica. Pregunte la cantidad y la frecuencia con que debe tomarlos. Siga las indicaciones. Paul las etiquetas de todos los demás medicamentos que esté usando para saber si también contienen acetaminofén, o pregunte a chirinos médico o farmacéutico. El acetaminofén puede causar daño en el hígado cuando no se gisella de forma correcta. No use más de 4 gramos (4000 miligramos) en total de acetaminofeno en un día.    Los CARY,lisbet el ibuprofeno, ayudan a disminuir la inflamación, el dolor y la fiebre. Los CARY pueden causar sangrado estomacal o problemas renales en ciertas personas. Si usted gisella un medicamento anticoagulante, siempre pregúntele a chirinos médico si los CARY son seguros para usted. Siempre apul la etiqueta de justin medicamento y siga las instrucciones.    El medicamento para el resfriadoayuda a disminuir la inflamación, a controlar la tos y a aliviar la congestión del pecho o nasal.    El rociador nasal de agua salinaayuda a disminuir la congestión nasal.    [place] kemi medicamentos lisbet se le haya indicado.Consulte con chirinos médico si usted lily que chirinos medicamento no le está ayudando o si presenta efectos secundarios. Infórmele si es alérgico a algún medicamento. Mantenga yvette lista actualizada de los medicamentos, las vitaminas y los productos herbales que gisella. Incluya los siguientes datos de los medicamentos: cantidad, frecuencia y motivo de administración. Traiga con usted la lista o los envases de las píldoras a kemi citas de seguimiento. Lleve la lista de los medicamentos con usted en amish de yvette emergencia.  El manejo de kemi síntomas:    [place] líquidos lisbet se le indique para evitar yvette deshidratación.Pregunte cuánto líquido debe jonas cada día y cuáles líquidos son los más adecuados para usted. Pregunte si usted debería jonas yvette solución de rehidratación oral (SRO). Yvette SRO tiene las cantidades exactas de agua, sal y azúcar que usted necesita para reemplazar los líquidos corporales. [place] podría ayudarlo a evitar la deshidratación a causa del vómito y de la diarrea. No tome líquidos con cafeína. Los líquidos con cafeína pueden empeorar la deshidratación.    Descanse lo suficiente para ayudar a que chirinos cuerpo sane.[place] siestas german el día. Pregunte a chirinos médico cuándo puede regresar a chirinos trabajo y a kemi actividades cotidianas.    Use un humidificador de krishna fría para respirar más fácilmente.Pregunte a chirinos médico cómo usar un humidificador de vapor frío.    Coma miel o use pastillas para la tos para el dolor de garganta.Los caramelos para la tos están disponibles sin receta médica. Siga las indicaciones para jonas los caramelos para la tos.    No fume ni esté cerca a alguien que esté fumando.La nicotina y otras sustancias químicas que contienen los cigarrillos y cigarros pueden dañar los pulmones. El fumar también puede retrasar la sanación. Pida información a chirinos médico si usted actualmente fuma y necesita ayuda para dejar de fumar. Los cigarrillos electrónicos o el tabaco sin humo igualmente contienen nicotina. Consulte con chirinos médico antes de utilizar estos productos.  Prevenga la propagación de gérmenes:      Lávese las gaurav frecuentemente.Lávese las gaurav varias veces al día. Lávese después de usar el baño, después de cambiar pañales y antes de preparar la comida o comer. Use siempre agua y jabón. [place] las gaurav enjabonadas, entrelazando los dedos. Lávese el frente y el dorso de las gaurav, y entre los dedos. Use los dedos de yvette mano para restregar debajo de las uñas de la otra mano. Lávese german al menos 20 segundos. Enjuague con agua corriente caliente german varios segundos. Luego séquese las gaurav con yvette toalla limpia o yvette toalla de papel. Puede usar un desinfectante para gaurav que contenga alcohol, si no hay agua y jabón disponibles. No se toque los ojos, la nariz o la boca sin antes lavarse las gaurav.  Lavado de gaurav      Cúbrase al toser o estornudar.Use un pañuelo que cubra la boca y la nariz. Arroje el pañuelo a la basura de inmediato. Use el ángulo del brazo si no tiene un pañuelo disponible. Lávese las gaurav con agua y jabón o use un desinfectante de gaurav.    Manténgase alejado de los demás mientras esté enfermo.Evite las multitudes lo más que pueda.    Pregunte sobre las vacunas que pudiera necesitar.Hable con chirinos médico sobre chirinos historial de vacunación. Chirinos médico le indicará qué vacunas necesita y cuándo recibirlas.  Vacúnese contra la influenza (gripe) tan pronto lisbet se recomiende cada año.La vacuna antigripal se ofrece a partir de septiembre u octubre. Los virus de la gripe cambian, por lo que es importante vacunarse contra la gripe cada año.    Vacúnese contra la neumonía si se recomienda.Esta vacuna generalmente se recomienda cada 5 años. Chirinos médico le indicará cuándo recibir esta vacuna, de ser necesaria.  Acuda a la consulta de control con chirinos médico según las indicaciones:Anote kemi preguntas para que se acuerde de hacerlas german kemi visitas.

## 2025-02-03 NOTE — ED PROVIDER NOTE - PROGRESS NOTE DETAILS
LUIS Cano - patient seen and examined - lung sounds clear BL - no ronchi crackles or wheezing - discussed with patient that with prolonged cough and blood streaks - offered CXR to r/o PNA - patient concerned with radiation - declining at this time - shared decision making engaged with patient - will check rvp and throat culture and treat symptomatically. Given strict return to ER precautions. Pt. understands and agrees with plan.

## 2025-02-03 NOTE — ED PROVIDER NOTE - CLINICAL SUMMARY MEDICAL DECISION MAKING FREE TEXT BOX
29-year-old female  23 weeks pregnant presents to the emergency room complaining of flulike symptoms x 4 days  Likely viral syndrome, rule out flu COVID rule out strep throat, low suspicion for PE at this time patient with normal vital signs  Low suspicion but would recommend chest x-ray to rule out pneumonia patient declining at this time  Will check RVP throat culture  Bedside ultrasound for fetal heart rate check  Over-the-counter cough medicine  Outpatient OB/GYN follow-up

## 2025-02-03 NOTE — ED PROVIDER NOTE - OBJECTIVE STATEMENT
29-year-old female  roughly 22 weeks pregnant no other past medical history presents the emergency room complaining of flulike symptoms x 4 days.  Patient states 4 days ago developed sore throat which progressed to sore throat nasal congestion body aches fevers chills dry cough which became productive.  Patient states over the last 2 days has had generalized malaise and weakness and no energy.  Patient states congestion fevers have improved but still complaining of sore throat and productive cough.  Patient states for the last 2 days has noticed episodes of streaked blood with prolonged coughing with mucus and also chest discomfort with coughing. Patient has not taken anything for symptoms.  Patient called her GYN today and was recommended to come to the ER for further evaluation.  Of note when symptoms first started 3 days ago patient went to urgent care had a negative rapid flu and COVID test at that time.  Denies night sweats shortness of breath nausea vomiting diarrhea recent travel or sick contacts.

## 2025-02-03 NOTE — ED ADULT NURSE NOTE - OBJECTIVE STATEMENT
Patient coming to ED for sore throat, nasal congestion, headache, and hemoptysis x 2 days. Patient also endorses CP, ekg done. Patient A&OX4. Breathing non-labored. Lozenges given and tolerated well, refused tylenol. Plan of care in progress.

## 2025-02-03 NOTE — ED PROVIDER NOTE - PATIENT PORTAL LINK FT
You can access the FollowMyHealth Patient Portal offered by [facility] by registering at the following website: [email or web address]. By joining [email or web address]’s FollowMyHealth portal, you will also be able to view your health information using other applications (apps) compatible with our system.

## 2025-02-03 NOTE — ED ADULT TRIAGE NOTE - CHIEF COMPLAINT QUOTE
Patient is 22 weeks pregnant and currently endorsing sore throat, nasal congestion, headache and fever, reports negative flu panel at urgent care. Patient also complains of hemoptysis for the past two days and CP, denies any SOB. Patient to stay in ED as per L&D provider Eva. No pertinent medical history.

## 2025-02-04 LAB
CULTURE RESULTS: SIGNIFICANT CHANGE UP
SPECIMEN SOURCE: SIGNIFICANT CHANGE UP

## 2025-02-18 ENCOUNTER — APPOINTMENT (OUTPATIENT)
Dept: OBGYN | Facility: CLINIC | Age: 30
End: 2025-02-18
Payer: COMMERCIAL

## 2025-02-18 PROCEDURE — 0502F SUBSEQUENT PRENATAL CARE: CPT

## 2025-02-18 PROCEDURE — 81002 URINALYSIS NONAUTO W/O SCOPE: CPT

## 2025-03-18 ENCOUNTER — APPOINTMENT (OUTPATIENT)
Dept: OBGYN | Facility: CLINIC | Age: 30
End: 2025-03-18
Payer: COMMERCIAL

## 2025-03-18 PROCEDURE — 76815 OB US LIMITED FETUS(S): CPT

## 2025-03-18 PROCEDURE — 36415 COLL VENOUS BLD VENIPUNCTURE: CPT

## 2025-03-18 PROCEDURE — 81002 URINALYSIS NONAUTO W/O SCOPE: CPT

## 2025-03-18 PROCEDURE — 0502F SUBSEQUENT PRENATAL CARE: CPT

## 2025-04-08 ENCOUNTER — APPOINTMENT (OUTPATIENT)
Dept: OBGYN | Facility: CLINIC | Age: 30
End: 2025-04-08
Payer: COMMERCIAL

## 2025-04-08 PROCEDURE — 81002 URINALYSIS NONAUTO W/O SCOPE: CPT

## 2025-04-08 PROCEDURE — 76805 OB US >/= 14 WKS SNGL FETUS: CPT

## 2025-04-08 PROCEDURE — 0502F SUBSEQUENT PRENATAL CARE: CPT

## 2025-04-08 PROCEDURE — 76819 FETAL BIOPHYS PROFIL W/O NST: CPT

## 2025-04-08 PROCEDURE — 76820 UMBILICAL ARTERY ECHO: CPT

## 2025-04-29 ENCOUNTER — APPOINTMENT (OUTPATIENT)
Dept: OBGYN | Facility: CLINIC | Age: 30
End: 2025-04-29
Payer: COMMERCIAL

## 2025-04-29 PROCEDURE — 76815 OB US LIMITED FETUS(S): CPT

## 2025-04-29 PROCEDURE — 81002 URINALYSIS NONAUTO W/O SCOPE: CPT

## 2025-04-29 PROCEDURE — 0502F SUBSEQUENT PRENATAL CARE: CPT

## 2025-05-12 ENCOUNTER — APPOINTMENT (OUTPATIENT)
Dept: OBGYN | Facility: CLINIC | Age: 30
End: 2025-05-12
Payer: COMMERCIAL

## 2025-05-12 PROCEDURE — 76815 OB US LIMITED FETUS(S): CPT

## 2025-05-12 PROCEDURE — 0502F SUBSEQUENT PRENATAL CARE: CPT

## 2025-05-12 PROCEDURE — 81002 URINALYSIS NONAUTO W/O SCOPE: CPT

## 2025-05-23 ENCOUNTER — APPOINTMENT (OUTPATIENT)
Dept: OBGYN | Facility: CLINIC | Age: 30
End: 2025-05-23
Payer: COMMERCIAL

## 2025-05-23 PROCEDURE — 36415 COLL VENOUS BLD VENIPUNCTURE: CPT

## 2025-05-23 PROCEDURE — 0502F SUBSEQUENT PRENATAL CARE: CPT

## 2025-05-23 PROCEDURE — 81002 URINALYSIS NONAUTO W/O SCOPE: CPT

## (undated) DEVICE — GLV 7 PROTEXIS (WHITE)

## (undated) DEVICE — TUBING ASPIRATION HANDLE

## (undated) DEVICE — LAP PAD W RING 18 X 18"

## (undated) DEVICE — TUBING GYRUS ACMI COLLECTION SET 6FT

## (undated) DEVICE — DRAPE LIGHT HANDLE COVER (GREEN)

## (undated) DEVICE — DRAPE 1/2 SHEET 40X57"

## (undated) DEVICE — TUBING MEDI-VAC W MAXIGRIP CONNECTORS 1/4"X6'

## (undated) DEVICE — VENODYNE/SCD SLEEVE CALF MEDIUM

## (undated) DEVICE — PACK LITHOTOMY